# Patient Record
(demographics unavailable — no encounter records)

---

## 2020-04-05 NOTE — ULT
Sonogram right upper quadrant



HISTORY: Right upper quadrant pain.



FINDINGS: Normal gallbladder not seen. At the gallbladder fossa, an echogenicity is present with post
erior shadowing. This could represent the duodenum/gas or a gallbladder contracted around shadowing

stones. No gallbladder wall thickening or pericholecystic fluid. Common duct is 0.3 cm.



Liver is unremarkable without focal mass or intrahepatic biliary dilatation. No free fluid.



  



IMPRESSION :

Abnormality at the gallbladder fossa is were favored to represent gallstones within a nondistended ga
llbladder. No evidence of acute biliary obstruction.



Reported By: ERICA Germain 

Electronically Signed:  4/5/2020 3:57 PM

## 2020-04-08 NOTE — EKG
Test Reason : EPIGASTRIC PAIN

Blood Pressure : ***/*** mmHG

Vent. Rate : 102 BPM     Atrial Rate : 102 BPM

   P-R Int : 146 ms          QRS Dur : 078 ms

    QT Int : 352 ms       P-R-T Axes : 000 165 158 degrees

   QTc Int : 458 ms

 

Sinus tachycardia

Right axis deviation

Abnormal ECG

 

Confirmed by ELSA NICOLE, SHIRA ([id number]),  GALLITO SERVIN ([id number]) on 4/8/2020 1:48:27 PM

 

[facility]           Confirmed By:SHIRA HUNTER MD